# Patient Record
Sex: FEMALE | Race: BLACK OR AFRICAN AMERICAN | NOT HISPANIC OR LATINO | Employment: OTHER | URBAN - NONMETROPOLITAN AREA
[De-identification: names, ages, dates, MRNs, and addresses within clinical notes are randomized per-mention and may not be internally consistent; named-entity substitution may affect disease eponyms.]

---

## 2022-01-01 ENCOUNTER — APPOINTMENT (OUTPATIENT)
Dept: GENERAL RADIOLOGY | Facility: HOSPITAL | Age: 87
End: 2022-01-01

## 2022-01-01 ENCOUNTER — HOSPITAL ENCOUNTER (INPATIENT)
Facility: HOSPITAL | Age: 87
LOS: 1 days | End: 2022-12-12
Attending: NEUROLOGICAL SURGERY | Admitting: NEUROLOGICAL SURGERY

## 2022-01-01 VITALS
RESPIRATION RATE: 50 BRPM | OXYGEN SATURATION: 94 % | SYSTOLIC BLOOD PRESSURE: 164 MMHG | TEMPERATURE: 102.5 F | DIASTOLIC BLOOD PRESSURE: 82 MMHG | HEART RATE: 108 BPM

## 2022-01-01 PROCEDURE — 74018 RADEX ABDOMEN 1 VIEW: CPT

## 2022-01-01 PROCEDURE — 94799 UNLISTED PULMONARY SVC/PX: CPT

## 2022-01-01 PROCEDURE — 25010000002 MORPHINE PER 10 MG: Performed by: NEUROLOGICAL SURGERY

## 2022-01-01 PROCEDURE — 0BH17EZ INSERTION OF ENDOTRACHEAL AIRWAY INTO TRACHEA, VIA NATURAL OR ARTIFICIAL OPENING: ICD-10-PCS | Performed by: NEUROLOGICAL SURGERY

## 2022-01-01 PROCEDURE — 5A1935Z RESPIRATORY VENTILATION, LESS THAN 24 CONSECUTIVE HOURS: ICD-10-PCS | Performed by: NEUROLOGICAL SURGERY

## 2022-01-01 PROCEDURE — 94002 VENT MGMT INPAT INIT DAY: CPT

## 2022-01-01 PROCEDURE — 71045 X-RAY EXAM CHEST 1 VIEW: CPT

## 2022-01-01 PROCEDURE — 99221 1ST HOSP IP/OBS SF/LOW 40: CPT | Performed by: NEUROLOGICAL SURGERY

## 2022-01-01 PROCEDURE — 25010000002 LORAZEPAM PER 2 MG: Performed by: NEUROLOGICAL SURGERY

## 2022-01-01 PROCEDURE — 25010000002 MORPHINE PER 10 MG: Performed by: NURSE PRACTITIONER

## 2022-01-01 RX ORDER — SCOLOPAMINE TRANSDERMAL SYSTEM 1 MG/1
1 PATCH, EXTENDED RELEASE TRANSDERMAL
Status: DISCONTINUED | OUTPATIENT
Start: 2022-01-01 | End: 2022-12-13 | Stop reason: HOSPADM

## 2022-01-01 RX ORDER — SODIUM CHLORIDE 0.9 % (FLUSH) 0.9 %
10 SYRINGE (ML) INJECTION EVERY 12 HOURS SCHEDULED
Status: DISCONTINUED | OUTPATIENT
Start: 2022-01-01 | End: 2022-12-13 | Stop reason: HOSPADM

## 2022-01-01 RX ORDER — SODIUM CHLORIDE 0.9 % (FLUSH) 0.9 %
10 SYRINGE (ML) INJECTION AS NEEDED
Status: DISCONTINUED | OUTPATIENT
Start: 2022-01-01 | End: 2022-12-13 | Stop reason: HOSPADM

## 2022-01-01 RX ORDER — ACETAMINOPHEN 650 MG/1
650 SUPPOSITORY RECTAL EVERY 4 HOURS PRN
Status: DISCONTINUED | OUTPATIENT
Start: 2022-01-01 | End: 2022-12-13 | Stop reason: HOSPADM

## 2022-01-01 RX ORDER — ACETAMINOPHEN 325 MG/1
650 TABLET ORAL EVERY 4 HOURS PRN
Status: DISCONTINUED | OUTPATIENT
Start: 2022-01-01 | End: 2022-12-13 | Stop reason: HOSPADM

## 2022-01-01 RX ORDER — LORAZEPAM 2 MG/ML
2 INJECTION INTRAMUSCULAR
Status: DISCONTINUED | OUTPATIENT
Start: 2022-01-01 | End: 2022-12-13 | Stop reason: HOSPADM

## 2022-01-01 RX ORDER — ATROPINE SULFATE 10 MG/ML
2 SOLUTION/ DROPS OPHTHALMIC 2 TIMES DAILY PRN
Status: DISCONTINUED | OUTPATIENT
Start: 2022-01-01 | End: 2022-12-13 | Stop reason: HOSPADM

## 2022-01-01 RX ORDER — SODIUM CHLORIDE 9 MG/ML
40 INJECTION, SOLUTION INTRAVENOUS AS NEEDED
Status: DISCONTINUED | OUTPATIENT
Start: 2022-01-01 | End: 2022-12-13 | Stop reason: HOSPADM

## 2022-01-01 RX ORDER — ACETAMINOPHEN 160 MG/5ML
650 SOLUTION ORAL EVERY 4 HOURS PRN
Status: DISCONTINUED | OUTPATIENT
Start: 2022-01-01 | End: 2022-12-13 | Stop reason: HOSPADM

## 2022-01-01 RX ADMIN — MORPHINE SULFATE 4 MG: 4 INJECTION, SOLUTION INTRAMUSCULAR; INTRAVENOUS at 17:09

## 2022-01-01 RX ADMIN — Medication 10 ML: at 21:00

## 2022-01-01 RX ADMIN — LORAZEPAM 2 MG: 2 INJECTION INTRAMUSCULAR; INTRAVENOUS at 17:48

## 2022-01-01 RX ADMIN — SCOPALAMINE 1 PATCH: 1 PATCH, EXTENDED RELEASE TRANSDERMAL at 17:49

## 2022-01-01 RX ADMIN — ATROPINE SULFATE 2 DROP: 10 SOLUTION/ DROPS OPHTHALMIC at 17:49

## 2022-01-01 RX ADMIN — MORPHINE SULFATE 4 MG: 4 INJECTION, SOLUTION INTRAMUSCULAR; INTRAVENOUS at 19:43

## 2022-01-01 RX ADMIN — MORPHINE SULFATE 4 MG: 4 INJECTION, SOLUTION INTRAMUSCULAR; INTRAVENOUS at 22:28

## 2022-01-01 RX ADMIN — MORPHINE SULFATE 4 MG: 4 INJECTION, SOLUTION INTRAMUSCULAR; INTRAVENOUS at 15:47

## 2022-12-11 PROBLEM — I61.9 HEMORRHAGIC STROKE: Status: ACTIVE | Noted: 2022-01-01

## 2022-12-11 NOTE — H&P
Date of Admission: 12/11/2022  Primary Care Physician: Provider, No Known        Subjective:    Chief complaint  hemorrhagic stroke    HPI: 89-year-old female who lives independently and was found by her relative down.  Unresponsive, vomiting.  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  She was intubated and transferred for neurosurgical evaluation.    Review of Systems   All other systems reviewed and are negative.       Pertinent positives/negatives documented in HPI.  All other systems reviewed and negative.    Past Medical History: No past medical history on file.    Past Surgical History: No past surgical history on file.    Family History: family history is not on file.    Social History:      Code Status: DNR    Medications:  No medications prior to admission.       Allergies:  Not on File    Objective:  Physical Exam  Cardiovascular:      Rate and Rhythm: Normal rate and regular rhythm.   Pulmonary:      Effort: No respiratory distress.      Breath sounds: Normal breath sounds.   Abdominal:      General: There is no distension.      Palpations: Abdomen is soft.         Neurologic Exam  Repeat the 1 mm nonreactive, left pupil 3 mm nonreactive  Positive oculocephalic reflex  No gag  Decerebrate posturing with noxious stimulus  Vital Signs  FiO2 (%):  [75 %] 75 %        Results Review:  I reviewed the patient's new clinical results.  I reviewed the patient's new imaging results and agree with the interpretation.  I reviewed the patient's other test results and agree with the interpretation         Lab Results (last 24 hours)     ** No results found for the last 24 hours. **          Imaging Results (Last 24 Hours)     ** No results found for the last 24 hours. **                 Assessment/Plan:   Patient with a large deep thalamic stroke on the left with intraventricular extension and  hydrocephalus.  This is not a functionally survivable hemorrhagic stroke.  I discussed this extensively with the family.  At this point I recommend comfort care only and withdrawal of care ventilator support.  We discussed this at length.  They are going to notify the rest of the family and bring them in for visitation with the  understand that we will withdraw support.    Active Hospital Problems    Diagnosis    • **Hemorrhagic stroke (HCC)        I discussed the patient's findings and my recommendations with patient, family and nursing staff    Santiago Henry MD  12/11/22  17:11 CST    Time: More than 50% of time spent in counseling and coordination of care:  Total face-to-face/floor time 45 min.  Time spent in counseling 25 min. Counseling included the following topics: Diagnosis, condition, treatment, plan

## 2022-12-12 NOTE — PLAN OF CARE
Goal Outcome Evaluation:  Plan of Care Reviewed With: family        Progress: declining  Outcome Evaluation: Transferred to room 341 on comfort measures. Daughter and other family member at bedside. Nurse FELIPA Milian also at bedside.

## 2022-12-12 NOTE — CASE MANAGEMENT/SOCIAL WORK
Continued Stay Note   Lillie     Patient Name: Carley Davidson  MRN: [id number]  Today's Date: 12/12/2022    Admit Date: 12/11/2022        Discharge Plan     Row Name 12/12/22 1029       Plan    Plan Comments patient is comfort measures at this time not appropriate to screen at this time               Discharge Codes    No documentation.                     Lucille Mullins RN

## 2022-12-12 NOTE — PLAN OF CARE
Goal Outcome Evaluation:              Outcome Evaluation: Pt on comfort measures. Family at bedside. Resting comfortably other than respiratory rate. Family requested no medication initially in fear of hastening death. Educated on morphine and ativan, pathophysiollogy and mechanism, expressed need for patient comfort. Family states understanding and will do morphine as needed for patient. Emotional support given to family. All needs met at this time. Safety maintained. Plan of care revised/continued.  Problem: Nutrition Impairment (Mechanical Ventilation, Invasive)  Goal: Optimal Nutrition Delivery  Outcome: Unable to Meet, Plan Revised

## 2022-12-13 NOTE — DISCHARGE SUMMARY
Date of Discharge:  2022    Discharge Diagnosis: Hemorrhagic stroke    Presenting Problem/History of Present Illness  Hemorrhagic stroke (HCC) [I61.9]  Hemorrhagic stroke (HCC) [I61.9]       Hospital Course  Patient is a 89 y.o. female presented with large left deep hemorrhagic stroke.  After meeting with the family was explained that the patient has no meaningful chance of recovery.  Decision was made to make her DO NOT RESUSCITATE.  She passed away..      Procedures Performed         Consults:   Consults     No orders found from 2022 to 2022.          Pertinent Test Results: radiology: CT scan: Brain    Condition on Discharge:     Vital Signs       Physical Exam:   Physical Exam     Neurologic Exam       Discharge Disposition      Discharge Medications     Discharge Medications      Patient Not Prescribed Medications Upon Discharge         Discharge Diet:     Activity at Discharge:     Follow-up Appointments  No future appointments.      Test Results Pending at Discharge       Santiago Henry MD  22  13:03 CST    Time: Discharge 45 min